# Patient Record
Sex: MALE | Race: WHITE | URBAN - METROPOLITAN AREA
[De-identification: names, ages, dates, MRNs, and addresses within clinical notes are randomized per-mention and may not be internally consistent; named-entity substitution may affect disease eponyms.]

---

## 2017-03-23 ENCOUNTER — HISTORICAL (OUTPATIENT)
Dept: LAB | Facility: HOSPITAL | Age: 82
End: 2017-03-23

## 2017-09-21 ENCOUNTER — HISTORICAL (OUTPATIENT)
Dept: LAB | Facility: HOSPITAL | Age: 82
End: 2017-09-21

## 2017-09-21 LAB
ALBUMIN SERPL-MCNC: 3.8 GM/DL (ref 3.4–5)
ALBUMIN/GLOB SERPL: 1.1 RATIO (ref 1.1–2)
ALP SERPL-CCNC: 87 UNIT/L (ref 46–116)
ALT SERPL-CCNC: 22 UNIT/L (ref 12–78)
AST SERPL-CCNC: 17 UNIT/L (ref 15–37)
BILIRUB SERPL-MCNC: 1.6 MG/DL (ref 0.2–1)
BILIRUBIN DIRECT+TOT PNL SERPL-MCNC: 0.33 MG/DL (ref 0–0.2)
BILIRUBIN DIRECT+TOT PNL SERPL-MCNC: 1.27 MG/DL (ref 0–0.8)
BUN SERPL-MCNC: 17.9 MG/DL (ref 7–18)
CALCIUM SERPL-MCNC: 9 MG/DL (ref 8.5–10.1)
CHLORIDE SERPL-SCNC: 104 MMOL/L (ref 98–107)
CHOLEST SERPL-MCNC: 111 MG/DL (ref 0–200)
CHOLEST/HDLC SERPL: 2.6 {RATIO} (ref 0–5)
CO2 SERPL-SCNC: 32.3 MMOL/L (ref 21–32)
CREAT SERPL-MCNC: 1.13 MG/DL (ref 0.6–1.3)
GLOBULIN SER-MCNC: 3.4 GM/DL (ref 2.4–3.5)
GLUCOSE SERPL-MCNC: 98 MG/DL (ref 74–106)
HDLC SERPL-MCNC: 43 MG/DL (ref 40–60)
LDLC SERPL CALC-MCNC: 46 MG/DL (ref 0–129)
POTASSIUM SERPL-SCNC: 4.3 MMOL/L (ref 3.5–5.1)
PROT SERPL-MCNC: 7.2 GM/DL (ref 6.4–8.2)
PSA SERPL-MCNC: 1.89 NG/ML (ref 0–4)
SODIUM SERPL-SCNC: 143 MMOL/L (ref 136–145)
TRIGL SERPL-MCNC: 110 MG/DL
VLDLC SERPL CALC-MCNC: 22 MG/DL

## 2017-10-19 ENCOUNTER — HISTORICAL (OUTPATIENT)
Dept: ADMINISTRATIVE | Facility: HOSPITAL | Age: 82
End: 2017-10-19

## 2017-10-19 LAB
ABS NEUT (OLG): 4.72 X10(3)/MCL (ref 2.1–9.2)
BASOPHILS # BLD AUTO: 0 X10(3)/MCL (ref 0–0.2)
BASOPHILS NFR BLD AUTO: 0.3 %
EOSINOPHIL # BLD AUTO: 0.1 X10(3)/MCL (ref 0–0.9)
EOSINOPHIL NFR BLD AUTO: 1.6 %
ERYTHROCYTE [DISTWIDTH] IN BLOOD BY AUTOMATED COUNT: 13.6 % (ref 11.5–17)
HCT VFR BLD AUTO: 46 % (ref 42–52)
HGB BLD-MCNC: 14.9 GM/DL (ref 14–18)
LYMPHOCYTES # BLD AUTO: 1.5 X10(3)/MCL (ref 0.6–4.6)
LYMPHOCYTES NFR BLD AUTO: 21.9 %
MCH RBC QN AUTO: 32.2 PG (ref 27–31)
MCHC RBC AUTO-ENTMCNC: 32.4 GM/DL (ref 33–36)
MCV RBC AUTO: 99.4 FL (ref 80–94)
MONOCYTES # BLD AUTO: 0.5 X10(3)/MCL (ref 0.1–1.3)
MONOCYTES NFR BLD AUTO: 6.9 %
NEUTROPHILS # BLD AUTO: 4.7 X10(3)/MCL (ref 2.1–9.2)
NEUTROPHILS NFR BLD AUTO: 69.3 %
PLATELET # BLD AUTO: 108 X10(3)/MCL (ref 130–400)
PMV BLD AUTO: 9.9 FL (ref 9.4–12.4)
RBC # BLD AUTO: 4.63 X10(6)/MCL (ref 4.7–6.1)
VIT B12 SERPL-MCNC: 882 PG/ML (ref 193–986)
WBC # SPEC AUTO: 6.8 X10(3)/MCL (ref 4.5–11.5)

## 2017-12-20 ENCOUNTER — HISTORICAL (OUTPATIENT)
Dept: RADIOLOGY | Facility: HOSPITAL | Age: 82
End: 2017-12-20

## 2018-03-21 ENCOUNTER — HISTORICAL (OUTPATIENT)
Dept: LAB | Facility: HOSPITAL | Age: 83
End: 2018-03-21

## 2018-03-21 LAB
ALBUMIN SERPL-MCNC: 3.7 GM/DL (ref 3.4–5)
ALBUMIN/GLOB SERPL: 1 RATIO (ref 1.1–2)
ALP SERPL-CCNC: 75 UNIT/L (ref 46–116)
ALT SERPL-CCNC: 24 UNIT/L (ref 12–78)
AST SERPL-CCNC: 17 UNIT/L (ref 15–37)
BILIRUB SERPL-MCNC: 1.4 MG/DL (ref 0.2–1)
BILIRUBIN DIRECT+TOT PNL SERPL-MCNC: 0.3 MG/DL (ref 0–0.2)
BILIRUBIN DIRECT+TOT PNL SERPL-MCNC: 1.14 MG/DL (ref 0–0.8)
BUN SERPL-MCNC: 16.7 MG/DL (ref 7–18)
CALCIUM SERPL-MCNC: 9 MG/DL (ref 8.5–10.1)
CHLORIDE SERPL-SCNC: 104 MMOL/L (ref 98–107)
CHOLEST SERPL-MCNC: 133 MG/DL (ref 0–200)
CHOLEST/HDLC SERPL: 2.9 {RATIO} (ref 0–5)
CO2 SERPL-SCNC: 29.2 MMOL/L (ref 21–32)
CREAT SERPL-MCNC: 1.03 MG/DL (ref 0.6–1.3)
GLOBULIN SER-MCNC: 3.7 GM/DL (ref 2.4–3.5)
GLUCOSE SERPL-MCNC: 102 MG/DL (ref 74–106)
HDLC SERPL-MCNC: 46 MG/DL (ref 40–60)
LDLC SERPL CALC-MCNC: 58 MG/DL (ref 0–129)
POTASSIUM SERPL-SCNC: 4 MMOL/L (ref 3.5–5.1)
PROT SERPL-MCNC: 7.4 GM/DL (ref 6.4–8.2)
SODIUM SERPL-SCNC: 140 MMOL/L (ref 136–145)
TRIGL SERPL-MCNC: 147 MG/DL
VLDLC SERPL CALC-MCNC: 29 MG/DL

## 2018-09-08 ENCOUNTER — HISTORICAL (OUTPATIENT)
Dept: LAB | Facility: HOSPITAL | Age: 83
End: 2018-09-08

## 2018-09-08 LAB
ALBUMIN SERPL-MCNC: 3.8 GM/DL (ref 3.4–5)
ALBUMIN/GLOB SERPL: 1.1 RATIO (ref 1.1–2)
ALP SERPL-CCNC: 80 UNIT/L (ref 46–116)
ALT SERPL-CCNC: 28 UNIT/L (ref 12–78)
AST SERPL-CCNC: 19 UNIT/L (ref 15–37)
BILIRUB SERPL-MCNC: 2.2 MG/DL (ref 0.2–1)
BILIRUBIN DIRECT+TOT PNL SERPL-MCNC: 0.36 MG/DL (ref 0–0.2)
BILIRUBIN DIRECT+TOT PNL SERPL-MCNC: 1.84 MG/DL (ref 0–0.8)
BUN SERPL-MCNC: 17 MG/DL (ref 7–18)
CALCIUM SERPL-MCNC: 8.7 MG/DL (ref 8.5–10.1)
CHLORIDE SERPL-SCNC: 104 MMOL/L (ref 98–107)
CHOLEST SERPL-MCNC: 108 MG/DL (ref 0–200)
CHOLEST/HDLC SERPL: 2.5 {RATIO} (ref 0–5)
CO2 SERPL-SCNC: 30.1 MMOL/L (ref 21–32)
CREAT SERPL-MCNC: 1.17 MG/DL (ref 0.6–1.3)
GLOBULIN SER-MCNC: 3.6 GM/DL (ref 2.4–3.5)
GLUCOSE SERPL-MCNC: 84 MG/DL (ref 74–106)
HDLC SERPL-MCNC: 43 MG/DL (ref 40–60)
LDLC SERPL CALC-MCNC: 36 MG/DL (ref 0–129)
POTASSIUM SERPL-SCNC: 4 MMOL/L (ref 3.5–5.1)
PROT SERPL-MCNC: 7.4 GM/DL (ref 6.4–8.2)
PSA SERPL-MCNC: 1.56 NG/ML (ref 0–4)
SODIUM SERPL-SCNC: 140 MMOL/L (ref 136–145)
TRIGL SERPL-MCNC: 143 MG/DL
VLDLC SERPL CALC-MCNC: 29 MG/DL

## 2018-09-14 ENCOUNTER — HISTORICAL (OUTPATIENT)
Dept: RADIOLOGY | Facility: HOSPITAL | Age: 83
End: 2018-09-14

## 2018-10-15 ENCOUNTER — HISTORICAL (OUTPATIENT)
Dept: LAB | Facility: HOSPITAL | Age: 83
End: 2018-10-15

## 2018-10-15 LAB
ABS NEUT (OLG): 4.24 X10(3)/MCL (ref 2.1–9.2)
ALBUMIN SERPL-MCNC: 3.8 GM/DL (ref 3.4–5)
ALBUMIN/GLOB SERPL: 1 RATIO (ref 1.1–2)
ALP SERPL-CCNC: 77 UNIT/L (ref 46–116)
ALT SERPL-CCNC: 25 UNIT/L (ref 12–78)
AST SERPL-CCNC: 13 UNIT/L (ref 15–37)
BASOPHILS # BLD AUTO: 0 X10(3)/MCL (ref 0–0.2)
BASOPHILS NFR BLD AUTO: 0 %
BILIRUB SERPL-MCNC: 1.9 MG/DL (ref 0.2–1)
BILIRUBIN DIRECT+TOT PNL SERPL-MCNC: 0.37 MG/DL (ref 0–0.2)
BILIRUBIN DIRECT+TOT PNL SERPL-MCNC: 1.53 MG/DL (ref 0–0.8)
BUN SERPL-MCNC: 20.7 MG/DL (ref 7–18)
CALCIUM SERPL-MCNC: 9.1 MG/DL (ref 8.5–10.1)
CHLORIDE SERPL-SCNC: 103 MMOL/L (ref 98–107)
CO2 SERPL-SCNC: 30.1 MMOL/L (ref 21–32)
CREAT SERPL-MCNC: 1.28 MG/DL (ref 0.6–1.3)
EOSINOPHIL # BLD AUTO: 0.1 X10(3)/MCL (ref 0–0.9)
EOSINOPHIL NFR BLD AUTO: 1 %
ERYTHROCYTE [DISTWIDTH] IN BLOOD BY AUTOMATED COUNT: 13 % (ref 11.5–17)
GLOBULIN SER-MCNC: 3.8 GM/DL (ref 2.4–3.5)
GLUCOSE SERPL-MCNC: 122 MG/DL (ref 74–106)
HCT VFR BLD AUTO: 47 % (ref 42–52)
HGB BLD-MCNC: 15.1 GM/DL (ref 14–18)
IMM GRANULOCYTES # BLD AUTO: 0.06 % (ref 0–0.02)
IMM GRANULOCYTES NFR BLD AUTO: 1 % (ref 0–0.43)
LYMPHOCYTES # BLD AUTO: 1.5 X10(3)/MCL (ref 0.6–4.6)
LYMPHOCYTES NFR BLD AUTO: 24 %
MCH RBC QN AUTO: 31.7 PG (ref 27–31)
MCHC RBC AUTO-ENTMCNC: 32.1 GM/DL (ref 33–36)
MCV RBC AUTO: 98.7 FL (ref 80–94)
MONOCYTES # BLD AUTO: 0.3 X10(3)/MCL (ref 0.1–1.3)
MONOCYTES NFR BLD AUTO: 6 %
NEUTROPHILS # BLD AUTO: 4.24 X10(3)/MCL (ref 1.4–7.9)
NEUTROPHILS NFR BLD AUTO: 68 %
PLATELET # BLD AUTO: 110 X10(3)/MCL (ref 130–400)
PMV BLD AUTO: 10.1 FL (ref 9.4–12.4)
POTASSIUM SERPL-SCNC: 4.3 MMOL/L (ref 3.5–5.1)
PROT SERPL-MCNC: 7.6 GM/DL (ref 6.4–8.2)
RBC # BLD AUTO: 4.76 X10(6)/MCL (ref 4.7–6.1)
SODIUM SERPL-SCNC: 139 MMOL/L (ref 136–145)
VIT B12 SERPL-MCNC: 638 PG/ML (ref 193–986)
WBC # SPEC AUTO: 6.2 X10(3)/MCL (ref 4.5–11.5)

## 2018-10-24 ENCOUNTER — HISTORICAL (OUTPATIENT)
Dept: RADIOLOGY | Facility: HOSPITAL | Age: 83
End: 2018-10-24

## 2018-10-24 LAB
ABS NEUT (OLG): 3.79 X10(3)/MCL (ref 2.1–9.2)
ALBUMIN SERPL-MCNC: 3.8 GM/DL (ref 3.4–5)
ALBUMIN/GLOB SERPL: 1 RATIO (ref 1.1–2)
ALP SERPL-CCNC: 78 UNIT/L (ref 46–116)
ALT SERPL-CCNC: 27 UNIT/L (ref 12–78)
AST SERPL-CCNC: 19 UNIT/L (ref 15–37)
BASOPHILS # BLD AUTO: 0 X10(3)/MCL (ref 0–0.2)
BASOPHILS NFR BLD AUTO: 0 %
BILIRUB SERPL-MCNC: 1.6 MG/DL (ref 0.2–1)
BILIRUBIN DIRECT+TOT PNL SERPL-MCNC: 0.31 MG/DL (ref 0–0.2)
BILIRUBIN DIRECT+TOT PNL SERPL-MCNC: 1.29 MG/DL (ref 0–0.8)
BUN SERPL-MCNC: 17 MG/DL (ref 7–18)
CALCIUM SERPL-MCNC: 9.1 MG/DL (ref 8.5–10.1)
CHLORIDE SERPL-SCNC: 104 MMOL/L (ref 98–107)
CO2 SERPL-SCNC: 32.3 MMOL/L (ref 21–32)
CREAT SERPL-MCNC: 1.29 MG/DL (ref 0.6–1.3)
EOSINOPHIL # BLD AUTO: 0.1 X10(3)/MCL (ref 0–0.9)
EOSINOPHIL NFR BLD AUTO: 2 %
ERYTHROCYTE [DISTWIDTH] IN BLOOD BY AUTOMATED COUNT: 13.2 % (ref 11.5–17)
GLOBULIN SER-MCNC: 3.8 GM/DL (ref 2.4–3.5)
GLUCOSE SERPL-MCNC: 105 MG/DL (ref 74–106)
HCT VFR BLD AUTO: 46.6 % (ref 42–52)
HGB BLD-MCNC: 15 GM/DL (ref 14–18)
IMM GRANULOCYTES # BLD AUTO: 0.06 % (ref 0–0.02)
IMM GRANULOCYTES NFR BLD AUTO: 1 % (ref 0–0.43)
LYMPHOCYTES # BLD AUTO: 1.4 X10(3)/MCL (ref 0.6–4.6)
LYMPHOCYTES NFR BLD AUTO: 24 %
MCH RBC QN AUTO: 31.8 PG (ref 27–31)
MCHC RBC AUTO-ENTMCNC: 32.2 GM/DL (ref 33–36)
MCV RBC AUTO: 98.7 FL (ref 80–94)
MONOCYTES # BLD AUTO: 0.5 X10(3)/MCL (ref 0.1–1.3)
MONOCYTES NFR BLD AUTO: 8 %
NEUTROPHILS # BLD AUTO: 3.79 X10(3)/MCL (ref 1.4–7.9)
NEUTROPHILS NFR BLD AUTO: 66 %
PLATELET # BLD AUTO: 116 X10(3)/MCL (ref 130–400)
PMV BLD AUTO: 9.8 FL (ref 9.4–12.4)
POTASSIUM SERPL-SCNC: 4.4 MMOL/L (ref 3.5–5.1)
PROT SERPL-MCNC: 7.6 GM/DL (ref 6.4–8.2)
RBC # BLD AUTO: 4.72 X10(6)/MCL (ref 4.7–6.1)
SODIUM SERPL-SCNC: 141 MMOL/L (ref 136–145)
WBC # SPEC AUTO: 5.8 X10(3)/MCL (ref 4.5–11.5)

## 2019-02-19 ENCOUNTER — HISTORICAL (OUTPATIENT)
Dept: LAB | Facility: HOSPITAL | Age: 84
End: 2019-02-19

## 2019-02-19 LAB
ALBUMIN SERPL-MCNC: 4 GM/DL (ref 3.4–5)
ALP SERPL-CCNC: 132 UNIT/L (ref 46–116)
ALT SERPL-CCNC: 26 UNIT/L (ref 12–78)
AST SERPL-CCNC: 21 UNIT/L (ref 15–37)
BILIRUB SERPL-MCNC: 2 MG/DL (ref 0.2–1)
BILIRUBIN DIRECT+TOT PNL SERPL-MCNC: 0.35 MG/DL (ref 0–0.2)
BILIRUBIN DIRECT+TOT PNL SERPL-MCNC: 1.65 MG/DL (ref 0–0.8)
CHOLEST SERPL-MCNC: 111 MG/DL (ref 0–200)
CHOLEST/HDLC SERPL: 2.7 {RATIO} (ref 0–5)
HDLC SERPL-MCNC: 41 MG/DL (ref 40–60)
LDLC SERPL CALC-MCNC: 38 MG/DL (ref 0–129)
PROT SERPL-MCNC: 7.8 GM/DL (ref 6.4–8.2)
TRIGL SERPL-MCNC: 158 MG/DL
VLDLC SERPL CALC-MCNC: 32 MG/DL

## 2019-02-28 ENCOUNTER — HISTORICAL (OUTPATIENT)
Dept: LAB | Facility: HOSPITAL | Age: 84
End: 2019-02-28

## 2019-02-28 LAB
ABS NEUT (OLG): 4.01 X10(3)/MCL (ref 2.1–9.2)
ALBUMIN SERPL-MCNC: 4.1 GM/DL (ref 3.4–5)
ALBUMIN/GLOB SERPL: 1.1 RATIO (ref 1.1–2)
ALP SERPL-CCNC: 93 UNIT/L (ref 46–116)
ALT SERPL-CCNC: 27 UNIT/L (ref 12–78)
AST SERPL-CCNC: 21 UNIT/L (ref 15–37)
BASOPHILS # BLD AUTO: 0 X10(3)/MCL (ref 0–0.2)
BASOPHILS NFR BLD AUTO: 0 %
BILIRUB SERPL-MCNC: 1.8 MG/DL (ref 0.2–1)
BILIRUBIN DIRECT+TOT PNL SERPL-MCNC: 0.35 MG/DL (ref 0–0.2)
BILIRUBIN DIRECT+TOT PNL SERPL-MCNC: 1.45 MG/DL (ref 0–0.8)
BUN SERPL-MCNC: 18.2 MG/DL (ref 7–18)
CALCIUM SERPL-MCNC: 9.3 MG/DL (ref 8.5–10.1)
CHLORIDE SERPL-SCNC: 102 MMOL/L (ref 98–107)
CO2 SERPL-SCNC: 28.7 MMOL/L (ref 21–32)
CREAT SERPL-MCNC: 1.26 MG/DL (ref 0.6–1.3)
EOSINOPHIL # BLD AUTO: 0.1 X10(3)/MCL (ref 0–0.9)
EOSINOPHIL NFR BLD AUTO: 2 %
ERYTHROCYTE [DISTWIDTH] IN BLOOD BY AUTOMATED COUNT: 13.3 % (ref 11.5–17)
GLOBULIN SER-MCNC: 3.9 GM/DL (ref 2.4–3.5)
GLUCOSE SERPL-MCNC: 99 MG/DL (ref 74–106)
HCT VFR BLD AUTO: 49 % (ref 42–52)
HGB BLD-MCNC: 16 GM/DL (ref 14–18)
LYMPHOCYTES # BLD AUTO: 1.4 X10(3)/MCL (ref 0.6–4.6)
LYMPHOCYTES NFR BLD AUTO: 24 %
MCH RBC QN AUTO: 32.4 PG (ref 27–31)
MCHC RBC AUTO-ENTMCNC: 32.7 GM/DL (ref 33–36)
MCV RBC AUTO: 99.2 FL (ref 80–94)
MONOCYTES # BLD AUTO: 0.4 X10(3)/MCL (ref 0.1–1.3)
MONOCYTES NFR BLD AUTO: 7 %
NEUTROPHILS # BLD AUTO: 4.01 X10(3)/MCL (ref 1.4–7.9)
NEUTROPHILS NFR BLD AUTO: 66 %
PLATELET # BLD AUTO: 121 X10(3)/MCL (ref 130–400)
PMV BLD AUTO: 10 FL (ref 9.4–12.4)
POTASSIUM SERPL-SCNC: 4.5 MMOL/L (ref 3.5–5.1)
PROT SERPL-MCNC: 8 GM/DL (ref 6.4–8.2)
PSA SERPL-MCNC: 1.76 NG/ML (ref 0–4)
RBC # BLD AUTO: 4.94 X10(6)/MCL (ref 4.7–6.1)
SODIUM SERPL-SCNC: 140 MMOL/L (ref 136–145)
TSH SERPL-ACNC: 3.83 MIU/ML (ref 0.36–3.74)
WBC # SPEC AUTO: 6.1 X10(3)/MCL (ref 4.5–11.5)

## 2020-01-10 ENCOUNTER — HISTORICAL (OUTPATIENT)
Dept: RADIOLOGY | Facility: HOSPITAL | Age: 85
End: 2020-01-10

## 2020-03-09 ENCOUNTER — HOSPITAL ENCOUNTER (OUTPATIENT)
Dept: MEDSURG UNIT | Facility: HOSPITAL | Age: 85
End: 2020-03-11
Attending: INTERNAL MEDICINE | Admitting: INTERNAL MEDICINE

## 2020-03-09 ENCOUNTER — HISTORICAL (OUTPATIENT)
Dept: ADMINISTRATIVE | Facility: HOSPITAL | Age: 85
End: 2020-03-09

## 2020-03-10 LAB
APPEARANCE, UA: CLEAR
BACTERIA SPEC CULT: NORMAL
BILIRUB UR QL STRIP: NEGATIVE
BUN SERPL-MCNC: 53 MG/DL (ref 7–18)
BUN SERPL-MCNC: 58 MG/DL (ref 7–18)
BUN SERPL-MCNC: 68 MG/DL (ref 7–18)
BUN SERPL-MCNC: 69 MG/DL (ref 7–18)
CALCIUM SERPL-MCNC: 8.7 MG/DL (ref 8.5–10.1)
CALCIUM SERPL-MCNC: 8.8 MG/DL (ref 8.5–10.1)
CALCIUM SERPL-MCNC: 8.9 MG/DL (ref 8.5–10.1)
CALCIUM SERPL-MCNC: 9.1 MG/DL (ref 8.5–10.1)
CHLORIDE SERPL-SCNC: 120 MMOL/L (ref 98–107)
CHLORIDE SERPL-SCNC: 121 MMOL/L (ref 98–107)
CHLORIDE SERPL-SCNC: 124 MMOL/L (ref 98–107)
CHLORIDE SERPL-SCNC: 124 MMOL/L (ref 98–107)
CK SERPL-CCNC: 234 UNIT/L (ref 26–308)
CO2 SERPL-SCNC: 26.5 MMOL/L (ref 21–32)
CO2 SERPL-SCNC: 27.1 MMOL/L (ref 21–32)
CO2 SERPL-SCNC: 28.2 MMOL/L (ref 21–32)
CO2 SERPL-SCNC: 29.2 MMOL/L (ref 21–32)
COLOR UR: YELLOW
CREAT SERPL-MCNC: 1.57 MG/DL (ref 0.6–1.3)
CREAT SERPL-MCNC: 1.76 MG/DL (ref 0.6–1.3)
CREAT SERPL-MCNC: 2.06 MG/DL (ref 0.6–1.3)
CREAT SERPL-MCNC: 2.13 MG/DL (ref 0.6–1.3)
CREAT UR-MCNC: 94 MG/DL
CREAT/UREA NIT SERPL: 32
CREAT/UREA NIT SERPL: 33
CREAT/UREA NIT SERPL: 33
CREAT/UREA NIT SERPL: 34
D DIMER PPP IA.FEU-MCNC: 780 NG/ML FEU (ref 0–500)
GLUCOSE (UA): NEGATIVE
GLUCOSE SERPL-MCNC: 118 MG/DL (ref 74–106)
GLUCOSE SERPL-MCNC: 119 MG/DL (ref 74–106)
GLUCOSE SERPL-MCNC: 90 MG/DL (ref 74–106)
GLUCOSE SERPL-MCNC: 91 MG/DL (ref 74–106)
HGB UR QL STRIP: NEGATIVE
KETONES UR QL STRIP: NEGATIVE
LEUKOCYTE ESTERASE UR QL STRIP: NEGATIVE
MAGNESIUM SERPL-MCNC: 2.5 MG/DL (ref 1.8–2.4)
NITRITE UR QL STRIP: NEGATIVE
PH UR STRIP: 5 [PH] (ref 5–9)
POTASSIUM SERPL-SCNC: 3.4 MMOL/L (ref 3.5–5.1)
POTASSIUM SERPL-SCNC: 3.5 MMOL/L (ref 3.5–5.1)
POTASSIUM SERPL-SCNC: 3.7 MMOL/L (ref 3.5–5.1)
POTASSIUM SERPL-SCNC: 3.8 MMOL/L (ref 3.5–5.1)
PROT UR QL STRIP: NEGATIVE
PROT UR STRIP-MCNC: 28.9 MG/DL
RBC #/AREA URNS HPF: NORMAL /[HPF]
SODIUM SERPL-SCNC: 154 MMOL/L (ref 136–145)
SODIUM SERPL-SCNC: 157 MMOL/L (ref 136–145)
SODIUM SERPL-SCNC: 160 MMOL/L (ref 136–145)
SODIUM SERPL-SCNC: 162 MMOL/L (ref 136–145)
SODIUM UR-SCNC: 106 MMOL/L
SP GR UR STRIP: 1.02 (ref 1–1.03)
SQUAMOUS EPITHELIAL, UA: NORMAL
TROPONIN I SERPL-MCNC: 0.03 NG/ML (ref 0.02–0.06)
UROBILINOGEN UR STRIP-ACNC: 1
WBC #/AREA URNS HPF: NORMAL /[HPF]

## 2020-03-11 LAB
BUN SERPL-MCNC: 51 MG/DL (ref 7–18)
CALCIUM SERPL-MCNC: 8.4 MG/DL (ref 8.5–10.1)
CHLORIDE SERPL-SCNC: 119 MMOL/L (ref 98–107)
CO2 SERPL-SCNC: 25.9 MMOL/L (ref 21–32)
CREAT SERPL-MCNC: 1.52 MG/DL (ref 0.6–1.3)
CREAT/UREA NIT SERPL: 34
GLUCOSE SERPL-MCNC: 119 MG/DL (ref 74–106)
POTASSIUM SERPL-SCNC: 3.6 MMOL/L (ref 3.5–5.1)
SODIUM SERPL-SCNC: 151 MMOL/L (ref 136–145)

## 2020-03-12 LAB
ABS NEUT (OLG): 3.49 X10(3)/MCL (ref 2.1–9.2)
ALBUMIN SERPL-MCNC: 2.9 GM/DL (ref 3.4–5)
ALBUMIN/GLOB SERPL: 1 RATIO (ref 1.1–2)
ALP SERPL-CCNC: 96 UNIT/L (ref 46–116)
ALT SERPL-CCNC: 39 UNIT/L (ref 12–78)
AST SERPL-CCNC: 50 UNIT/L (ref 15–37)
BASOPHILS # BLD AUTO: 0 X10(3)/MCL (ref 0–0.2)
BASOPHILS NFR BLD AUTO: 0 %
BILIRUB SERPL-MCNC: 1.2 MG/DL (ref 0.2–1)
BILIRUBIN DIRECT+TOT PNL SERPL-MCNC: 0.38 MG/DL (ref 0–0.2)
BILIRUBIN DIRECT+TOT PNL SERPL-MCNC: 0.82 MG/DL (ref 0–0.8)
BUN SERPL-MCNC: 30 MG/DL (ref 7–18)
CALCIUM SERPL-MCNC: 8 MG/DL (ref 8.5–10.1)
CHLORIDE SERPL-SCNC: 112 MMOL/L (ref 98–107)
CO2 SERPL-SCNC: 29 MMOL/L (ref 21–32)
CREAT SERPL-MCNC: 1.3 MG/DL (ref 0.6–1.3)
EOSINOPHIL # BLD AUTO: 0.2 X10(3)/MCL (ref 0–0.9)
EOSINOPHIL NFR BLD AUTO: 3 %
ERYTHROCYTE [DISTWIDTH] IN BLOOD BY AUTOMATED COUNT: 13.1 % (ref 11.5–17)
GLOBULIN SER-MCNC: 2.8 GM/DL (ref 2.4–3.5)
GLUCOSE SERPL-MCNC: 112 MG/DL (ref 74–106)
HCT VFR BLD AUTO: 39 % (ref 42–52)
HGB BLD-MCNC: 12.7 GM/DL (ref 14–18)
IMM GRANULOCYTES # BLD AUTO: 0.01 % (ref 0–0.02)
IMM GRANULOCYTES NFR BLD AUTO: 0.2 % (ref 0–0.43)
LYMPHOCYTES # BLD AUTO: 1 X10(3)/MCL (ref 0.6–4.6)
LYMPHOCYTES NFR BLD AUTO: 20 %
MAGNESIUM SERPL-MCNC: 1.8 MG/DL (ref 1.8–2.4)
MCH RBC QN AUTO: 32.6 PG (ref 27–31)
MCHC RBC AUTO-ENTMCNC: 32.6 GM/DL (ref 33–36)
MCV RBC AUTO: 100.3 FL (ref 80–94)
MONOCYTES # BLD AUTO: 0.2 X10(3)/MCL (ref 0.1–1.3)
MONOCYTES NFR BLD AUTO: 5 %
NEUTROPHILS # BLD AUTO: 3.49 X10(3)/MCL (ref 1.4–7.9)
NEUTROPHILS NFR BLD AUTO: 71 %
PLATELET # BLD AUTO: 63 X10(3)/MCL (ref 130–400)
PMV BLD AUTO: 11.6 FL (ref 9.4–12.4)
POTASSIUM SERPL-SCNC: 3.4 MMOL/L (ref 3.5–5.1)
PROT SERPL-MCNC: 5.7 GM/DL (ref 6.4–8.2)
RBC # BLD AUTO: 3.89 X10(6)/MCL (ref 4.7–6.1)
SODIUM SERPL-SCNC: 145 MMOL/L (ref 136–145)
WBC # SPEC AUTO: 4.9 X10(3)/MCL (ref 4.5–11.5)

## 2022-04-30 NOTE — H&P
Patient:   Elizabeth Alvarenga            MRN: [id number]            FIN: [id number]               Age:   88 years     Sex:  Male     :  1931   Associated Diagnoses:   None   Author:   Kristy Chacko MD      Chief complaint: Unable to obtain, patient with advanced dementia and confused.    HPI: Patient is an 88-year-old male who has history of chronic ITP follows with heme oncology platelet numbers have been fairly stable, HTN, DJD, dementia, presented to [facility] from nursing home with hypotension, hypoxia, on further work-up patient was found to have profound hypernatremia, they did not have beds available some patient was transferred here for further care.  According to family patient has very poor intake because of his dementia, did not recall he has any cardiac history, on work-up at previous ER patient was found to have elevated d-dimer 1.55, troponin mildly elevated 0.037, proBNP mildly elevated 417 ,BUN 78/ creatinine 2.5 ,sodium was 162 and platelets are stable at 121.  Context: Dementia.  Modifying factors: None.  Accompanying signs and symptoms unable to obtain.    ROS: Unable to obtain due to patient dementia.      Health Status   Allergies:    Allergic Reactions (All)  No Known Allergies,    Allergies (1) Active Reaction  No Known Allergies None Documented     Current medications:  (Selected)   Inpatient Medications  Ordered  Allegra: 180 mg, form: Tab, Oral, Daily PRN for allergy symptoms, first dose 20 16:35:00 CDT  Ambien 5 mg oral tablet: 5 mg, form: Tab, Oral, At Bedtime PRN for insomnia, first dose 20 16:35:00 CDT  Benadryl: 25 mg, form: Cap, Oral, q4hr PRN for itching, first dose 20 23:45:00 CDT  D5W 1,000 mL: 1,000 mL, 1,000 mL, IV, 125 mL/hr, start date 03/10/20 7:40:00 CDT, 1.87, m2  Dulcolax Laxative 5 mg ORAL enteric coated tablet: 10 mg, form: Tab-EC, Oral, Daily PRN for constipation, first dose 20 16:35:00 CDT  NS (0.9% Sodium Chloride) Infusion  1,000 mL: 1,000 mL, 1,000 mL, IV, 100 mL/hr, start date 20 18:04:00 CDT  Norco 10 mg-325 mg oral tablet: 1 tab(s), form: Tab, Oral, q4hr PRN for pain, first dose 20 16:35:00 CDT  Tylenol 325 mg oral tablet: 650 mg, form: Tab, Oral, q4hr PRN for fever, first dose 20 16:35:00 CDT, > 38°C (100.4°F)  Tylenol 325 mg oral tablet: 650 mg, form: Tab, Oral, q4hr PRN for pain, mild, first dose 20 16:35:00 CDT  Zofran 2 mg/mL injectable solution: 4 mg, form: Injection, IV, q4hr PRN for nausea/vomiting, first dose 20 16:35:00 CDT  Zofran ODT 4 mg oral tablet, disintegratin mg, form: Tab-Dis, Oral, QID PRN for nausea, first dose 03/10/20 7:50:00 CDT  diphenhydrAMINE  ORAL: 25 mg, form: Cap, Oral, q4hr PRN for itching, first dose 20 16:35:00 CDT  hydrALAZINE 20 mg/mL injectable solution: 10 mg, form: Injection, IV, q3hr PRN for hypertension, first dose 20 16:35:00 CDT, SBP>160  Pending Complete  flurbiprofen ophthalmic: 1 drop(s), form: Soln-Opth, Eye-Right, q30min, Order duration: 4 dose(s), first dose 12 10:30:00 CST, stop date 12 12:29:00 CST, on arrival and every 30 minutes x 4 doses  Prescriptions  Prescribed  Zofran ODT 4 mg oral tablet, disintegratin mg = 1 tab(s), Oral, TID, PRN PRN as needed for nausea/vomiting, # 6 tab(s), 0 Refill(s), Pharmacy: [facility] [place]  Documented Medications  Documented  Lipitor 40 mg oral tablet: See Instructions, , monday and thursday1 tab(s) TWICE A WEEK, 0 Refill(s)  Namenda XR 28 mg oral capsule, extended release: = 1 tab(s), Oral, Daily, 0 Refill(s)  Nitroglycerin 0.4 Mg Tablet Sl:   TNF Medl (Template NonFormulary): 0 Refill(s)  aspirin 81 mg oral tablet: 81 mg = 1 tab(s), Oral, Daily, # 30 tab(s), 0 Refill(s)  benazepril 5 mg oral tablet: 5 mg = 1 tab(s), Oral, Daily, # 30 tab(s), 0 Refill(s)  isosorbide mononitrate 30 mg oral tablet, extended release: 1 tab, Oral, Daily, 0 Refill(s)  rivastigmine 6 mg oral  capsule: 6 mg = 1 cap(s), Oral, Daily, 0 Refill(s),    Medications (2) Active  Scheduled: (0)  Continuous: (1)  D5W 1,000 mL  1,000 mL, IV, 125 mL/hr  PRN: (1)  diphenhydrAMINE 25 mg Cap UD  25 mg 1 cap(s), Oral, q4hr     Problem list:    All Problems  Arthritis / SNOMED CT 8818248 / Confirmed  Chronic ITP (idiopathic thrombocytopenia) / SNOMED CT 836750036 / Confirmed  Cataracts / SNOMED CT 0053592918 / Confirmed  Hearing loss / SNOMED CT 03675222 / Confirmed  Hypertension / SNOMED CT 39618216 / Confirmed  Macrocytosis / SNOMED CT 3520029971 / Confirmed  Vitamin B12 deficiency / SNOMED CT 095196963 / Confirmed  Wears glasses / SNOMED CT 635559834 / Confirmed,    Active Problems (8)  Arthritis   Cataracts   Chronic ITP (idiopathic thrombocytopenia)   Hearing loss   Hypertension   Macrocytosis   Vitamin B12 deficiency   Wears glasses         Histories   Past Medical History:    Active  Wears glasses (867307339)  Cataracts (1883616110)  Hearing loss (78650097)  Hypertension (07951070)  Arthritis (6293063)  Resolved  Hepatitis (852310919):  Resolved.  Comments:  2012 CST 13:07 RORO Branch RN, Kassi CHO  in high school    2012 CST 10:14 RORO Rodney RN, Niya quevedo A he believes  Thrombocytopenia (0058896669):  Resolved.  Hyperlipidemia ([id number]):  Resolved.  Dementia (37879139):  Resolved.  Heart Disease (50255347):  Resolved.  Carotid artery disease ([id number]):  Resolved.   Family History:    Throat cancer....  Other (., )  Comments:  2014 10:56 Amanda Rouse LPN  COUSIN  IN 70'S FROM THROAT CANCER  Breast ca....  Other (., )  Comments:  2014 10:56 Amanda Rouse LPN.  FATHER SISTER HAD BREAST CANCER  IN 90'S NOT FROM CANCER     Procedure history:    Carotid Artery Endarterectomy (Right) on 2015 at 83 Years.  Comments:  2015 8:26 CDT - Swati SHIELDS, Red  auto-populated from  documented surgical case  Biopsy Bone Marrow Aspiration (., None) on 7/24/2014 at 82 Years.  Comments:  7/24/2014 10:22 CDT - Aaron RN, Sarah ALLEN  auto-populated from documented surgical case  Hernia repair (87188570) in 1970 at 39 Years.  Comments:  2/7/2012 13:01 CST - Jose Carlos SHIELDS, Kassi CHO  needed 4 surgeries to repair last surgery 7 years ago  Cholecystectomy (71775662) in 1964 at 33 Years.  Abdominal abscess ([id number]) in 1957 at 26 Years.  Appendectomy (965918207).  cataract B/L.  colonoscopy.   Social History        Social & Psychosocial Habits    Alcohol  02/07/2012  Use: Current    Type: Beer    Frequency: 1-2 times per week    07/17/2014 Risk Assessment: Medium Risk      Comment: occasionally - 04/28/2015 12:36 - Inge SHIELDS, Betsy Miller    Employment/School  02/07/2012  Highest education: High school    Exercise  07/17/2014 Risk Assessment: Does not exercise    Home/Environment  07/17/2014  Lives with: Spouse    Nutrition/Health  07/17/2014  Type of diet: Regular    Substance Use  07/17/2014 Risk Assessment: Denies Substance Abuse    10/23/2018  Use: Never    Tobacco  02/07/2012  Use: Past    Number of years: 1.5    Stopped at age: 24 Years    07/17/2014 Risk Assessment: Denies Tobacco Use    03/10/2020  Use: Former smoker, quit more    Patient Wants Consult For Cessation Counseling N/A    Abuse/Neglect  03/10/2020  SHX Any signs of abuse or neglect No  .        Physical Examination   Intake and Output   Reviewed Results: Fluid Balance Primitives(Date Range: 3/9/2020 19:00 CDT - 3/9/2020 19:00 CDT)      General:  No acute distress.    Eye:  Pupils are equal, round and reactive to light, Extraocular movements are intact.    HENT:  Normocephalic, dry mucosa.    Neck:  Supple, Non-tender.    Respiratory:  Lungs are clear to auscultation, Respirations are non-labored, diminished B/L..    Cardiovascular:  Regular rhythm, No murmur.    Gastrointestinal:  Soft, Non-tender,  Non-distended.    Vital Signs   3/10/2020 7:09 CDT       Temperature Oral          36.8 DegC                             Temperature Oral (calculated)             98.24 DegF                             Peripheral Pulse Rate     70 bpm                             Systolic Blood Pressure   135 mmHg                             Diastolic Blood Pressure  56 mmHg  LOW                             Mean Arterial Pressure, Cuff              82 mmHg    3/9/2020 23:37 CDT       Temperature Oral          36.2 DegC                             Temperature Oral (calculated)             97.16 DegF                             Peripheral Pulse Rate     94 bpm                             SpO2                      93 %  LOW                             Systolic Blood Pressure   114 mmHg                             Diastolic Blood Pressure  61 mmHg                             Mean Arterial Pressure, Cuff              79 mmHg    3/9/2020 19:00 CDT       Temperature Axillary      36.7 DegC                             Temperature Axillary (calculated)         98.06 DegF                             Heart Rate Monitored      78 bpm                             Respiratory Rate          20 br/min                             SpO2                      99 %                             Oxygen Therapy            Room air                             Systolic Blood Pressure   122 mmHg                             Diastolic Blood Pressure  61 mmHg        Vital Signs (last 24 hrs)_____  Last Charted___________  Temp Oral     36.8 DegC  (MAR 10 07:09)  Heart Rate Peripheral   70 bpm  (MAR 10 07:09)  Resp Rate         20 br/min  (MAR 09 19:00)  SBP      135 mmHg  (MAR 10 07:09)  DBP      L 56mmHg  (MAR 10 07:09)  SpO2      L 93%  (MAR 09 23:37)  Weight      68.5 kg  (MAR 10 06:00)  Height      180 cm  (MAR 10 01:13)  BMI      21.14  (MAR 10 01:13)     Measurements from flowsheet : Measurements   3/10/2020 6:00 CDT       Weight Measured           68.5 kg     3/10/2020 1:13 CDT       Weight Dosing             68.5 kg                             Weight Measured           68.5 kg                             Weight Measured and Calculated in Lbs     151.02 lb                             Height/Length Dosing      180 cm                             Height/Length Measured    180 cm                             Body Mass Index Measured  21.14 kg/m2     Musculoskeletal:  Normal range of motion, No tenderness.    Neurologic:  Alert, No focal deficits, significant memory deficit, only recognizes wife.    Psychiatric:  Cooperative.       Health Maintenance      Health Maintenance     Pending (in the next year)        OverDue           Coronary Artery Disease Maintenance-Antiplatelet Agent Prescribed due  and every            Coronary Artery Disease Maintenance-Lipid Lowering Therapy due  and every            Pneumococcal Vaccine due  04/29/15  and every 1  day(s)           Hypertension Management-Blood Pressure due  10/23/19  and every 1  year(s)           Advance Directive due  01/01/20  and every 1  year(s)           Cognitive Screening due  01/01/20  and every 1  year(s)           Fall Risk Assessment due  01/01/20  and every 1  year(s)           Geriatric Depression Screening due  01/01/20  and every 1  year(s)        Due            ADL Screening due  03/10/20  and every 1  year(s)           Hypertension Management-Education due  03/10/20  and every 1  year(s)           Medicare Annual Wellness Exam due  03/10/20  and every 1  year(s)           Pneumococcal Vaccine due  03/10/20  and every            Tetanus Vaccine due  03/10/20  and every 10  year(s)           Zoster Vaccine due  03/10/20  and every 100  year(s)        Due In Future            Smoking Cessation (Coronary Artery Disease) not due until  10/22/20  and every 2  year(s)           Functional Assessment not due until  01/01/21  and every 1  year(s)           Obesity Screening not due until  01/01/21  and every 1   year(s)           Hypertension Management-BMP not due until  02/10/21  and every 1  year(s)     Satisfied (in the past 1 year)        Satisfied            Blood Pressure Screening on  03/10/20.  Satisfied by Aliya Perry           Body Mass Index Check on  03/10/20.  Satisfied by Jonelle Chakraborty RN           Diabetes Screening on  03/10/20.  Satisfied by Ester Medel           Functional Assessment on  03/10/20.  Satisfied by Jonelle Chakraborty RN           Hypertension Management-Blood Pressure on  03/10/20.  Satisfied by Aliya Perry           Obesity Screening on  03/10/20.  Satisfied by Graciela Shipman CNA          Review / Management   Results review:     Labs (Last four charted values)  Na                   C 162 (MAR 10)   K                    3.8 (MAR 10)   CO2                  28.2 (MAR 10)   Cl                   C 124 (MAR 10)   Cr                   H 2.13 (MAR 10)   BUN                  H 69.0 (MAR 10)   Glucose Random       90 (MAR 10) .    Laboratory Results   Today's Lab Results : PowerNote Discrete Results   3/10/2020 9:15 CDT       Sodium Lvl                160 mmol/L  HI                             Potassium Lvl             3.7 mmol/L                             Chloride                  124 mmol/L  CRIT                             CO2                       29.2 mmol/L                             Calcium Lvl               8.8 mg/dL                             Magnesium Lvl             2.5 mg/dL  HI                             Glucose Lvl               91 mg/dL                             BUN                       68.0 mg/dL  HI                             Creatinine                2.06 mg/dL  HI                             BUN/Creat Ratio           33  NA                             eGFR-AA                   39 mL/min/1.73 m2  NA                             eGFR-MARK                  33 mL/min/1.73 m2  NA                             Total CK                  234 unit/L                              Troponin-I                0.03 ng/mL    3/10/2020 4:46 CDT       Sodium Lvl                162 mmol/L  CRIT                             Potassium Lvl             3.8 mmol/L                             Chloride                  124 mmol/L  CRIT                             CO2                       28.2 mmol/L                             Calcium Lvl               8.9 mg/dL                             Glucose Lvl               90 mg/dL                             BUN                       69.0 mg/dL  HI                             Creatinine                2.13 mg/dL  HI                             BUN/Creat Ratio           32  NA                             eGFR-AA                   38 mL/min/1.73 m2  NA                             eGFR-MARK                  31 mL/min/1.73 m2  NA        Chest x-ray results   Anterior/posterior        Consistent with: No acute cardiopulmonary disease   ECG interpretation:       Atrial dysrhythmia: Atrial fibrillation, repeat EKG this AM : NSR, non specific ST-T wave abnormality.    Condition:  Fair.        Assessment and plan:    Toxic metabolic encephalopathy  Hypernatremia  Severe dehydration  Acute kidney injury over imposed on chronic kidney disease a stage III  Metabolic alkalosis due to volume depletion  Atrial fibrillation, new onset?  Controlled ventricular rate.  Chads 2VASC score:4, PAF, now back to NSR.  Chronic ITP  Advanced dementia  History of hypertension  DJD  Increased troponin, although could represent demand ischemia will need to cycle.  Increased d-dimer could be reactive, will repeat if positive will obtain VQ scan    Plan:    Cycle troponin  Continue D5W, monitor sodium every 6 hours, expected increase no more than 10-12 mEq in 24 hours  Obtain UA, urine sodium, protein, creatinine  Renal ultrasound  Check postvoid residual  Continue home meds renally adjusted  Seroquel PRN for agitation  Cardiology consult  Patient is high risk for full  AC, will follow Cards recs.